# Patient Record
Sex: FEMALE | URBAN - METROPOLITAN AREA
[De-identification: names, ages, dates, MRNs, and addresses within clinical notes are randomized per-mention and may not be internally consistent; named-entity substitution may affect disease eponyms.]

---

## 2024-09-27 LAB
C. TRACHOMATIS, EXTERNAL RESULT: NEGATIVE
HEP B, EXTERNAL RESULT: NEGATIVE
HIV, EXTERNAL RESULT: NEGATIVE
N. GONORRHOEAE, EXTERNAL RESULT: NEGATIVE
RPR, EXTERNAL RESULT: NONREACTIVE
RUBELLA TITER, EXTERNAL RESULT: NORMAL

## 2024-11-06 ENCOUNTER — TELEPHONE (OUTPATIENT)
Age: 34
End: 2024-11-06

## 2024-11-06 NOTE — TELEPHONE ENCOUNTER
Spoke w/ patient and confirmed name and      Scheduled for GDM consult on  @ 8:30 [facility] (provided address)  (patient declined  appointment)     Early anatomy for early GDM and GC on  @ 9:45 [facility] (provided address)     Advised patient to arrive 10 min early to fill out paperwork    Informed patient that she could bring two guest over the age of 12.

## 2024-11-11 ENCOUNTER — TELEPHONE (OUTPATIENT)
Age: 34
End: 2024-11-11

## 2024-11-11 NOTE — TELEPHONE ENCOUNTER
Spoke w/ patient and confirmed name and      Changed office visit to VV at 8:30.    Patient confirmed appointment change.

## 2024-11-12 ENCOUNTER — TELEMEDICINE (OUTPATIENT)
Age: 34
End: 2024-11-12

## 2024-11-12 DIAGNOSIS — Z86.32 HISTORY OF GESTATIONAL DIABETES MELLITUS (GDM): ICD-10-CM

## 2024-11-12 DIAGNOSIS — O24.419 GESTATIONAL DIABETES MELLITUS (GDM) IN SECOND TRIMESTER, GESTATIONAL DIABETES METHOD OF CONTROL UNSPECIFIED: Primary | ICD-10-CM

## 2024-11-12 DIAGNOSIS — R73.03 PREDIABETES: ICD-10-CM

## 2024-11-12 DIAGNOSIS — Z83.3 FAMILY HISTORY OF DIABETES MELLITUS (DM): ICD-10-CM

## 2024-11-12 PROCEDURE — 99213 OFFICE O/P EST LOW 20 MIN: CPT

## 2024-11-12 RX ORDER — PEN NEEDLE, DIABETIC 31 GX5/16"
NEEDLE, DISPOSABLE MISCELLANEOUS
Qty: 200 EACH | Refills: 3 | Status: SHIPPED | OUTPATIENT
Start: 2024-11-12

## 2024-11-12 RX ORDER — GLUCOSAMINE HCL/CHONDROITIN SU 500-400 MG
CAPSULE ORAL
Qty: 200 STRIP | Refills: 3 | Status: SHIPPED | OUTPATIENT
Start: 2024-11-12

## 2024-11-12 RX ORDER — LANCETS 30 GAUGE
EACH MISCELLANEOUS
Qty: 200 EACH | Refills: 3 | Status: SHIPPED | OUTPATIENT
Start: 2024-11-12

## 2024-11-12 RX ORDER — BLOOD-GLUCOSE METER
EACH MISCELLANEOUS
Qty: 1 KIT | Refills: 0 | Status: SHIPPED | OUTPATIENT
Start: 2024-11-12

## 2024-11-12 NOTE — PROGRESS NOTES
Assessment & Plan   ASSESSMENT/PLAN:  1. Gestational diabetes mellitus (GDM) in second trimester, gestational diabetes method of control unspecified  2. Prediabetes  3. History of gestational diabetes mellitus (GDM)  4. Family history of diabetes mellitus (DM)    New GDM/ Pre-diabetes/Hx GDMA1/Fam Hx T2DM (father)  -10/13/24: 1 hr gtt fail 171; 10/29/24: 3 hr gtt fail 101 201 165 124  -24 A1C 5.7  -New GDM education reviewed (see below) and supplies sent to pharmacy  -Pt declines diabetes ed ref at this time. Reports she is well versed r/t GDMA1 in prior pregnancy  -Pt counseled uncontrolled diabetes mellitus can result in fetal macrosomia as well as other obstetrical complications such as preeclampsia, placental problems such as placental insufficiency, and other adverse pregnancy outcomes such as stillbirth. She is at approximately 50-60% risk of developing diabetes in the future.  -Obtain baseline CMP, TSH, --> CBC 24 wnl   -Submit logs weekly  -Pt aware is scheduled for initial MFM U/S appt 24    Recommendations  -Submit logs weekly   -Obtain baseline CMP, TSH  -F/U on  for initial MFM U/S    Subjective   Janie Griffith (:  1990) is a 34 y.o. female,New patient, here for evaluation of the following chief complaint(s):  1. Gestational diabetes mellitus (GDM) in second trimester, gestational diabetes method of control unspecified  2. Prediabetes  3. History of gestational diabetes mellitus (GDM)  4. Family history of diabetes mellitus (DM)    Patient has been newly diagnosed with Gestational Diabetes (GDM). During office visit today, verbal and written teaching were provided to the patient on the following: Gestational Diabetes (GDM), nutrition and carbohydrate choices, how to self-administer finger sticks, blood glucose goals, logging blood sugars, and signs and symptoms of hypoglycemia and hyperglycemia. Patient instructed to check blood sugars 4 times a day: fasting and 1-2 hours

## 2024-11-19 ENCOUNTER — TELEMEDICINE (OUTPATIENT)
Age: 34
End: 2024-11-19
Payer: COMMERCIAL

## 2024-11-19 ENCOUNTER — ROUTINE PRENATAL (OUTPATIENT)
Age: 34
End: 2024-11-19
Payer: COMMERCIAL

## 2024-11-19 VITALS — HEIGHT: 63 IN | HEART RATE: 97 BPM | SYSTOLIC BLOOD PRESSURE: 115 MMHG | DIASTOLIC BLOOD PRESSURE: 76 MMHG

## 2024-11-19 DIAGNOSIS — Z3A.15 15 WEEKS GESTATION OF PREGNANCY: ICD-10-CM

## 2024-11-19 DIAGNOSIS — O09.522 SUPERVISION OF ELDERLY MULTIGRAVIDA IN SECOND TRIMESTER: Primary | ICD-10-CM

## 2024-11-19 DIAGNOSIS — Z3A.15 15 WEEKS GESTATION OF PREGNANCY: Primary | ICD-10-CM

## 2024-11-19 PROCEDURE — 76805 OB US >/= 14 WKS SNGL FETUS: CPT | Performed by: STUDENT IN AN ORGANIZED HEALTH CARE EDUCATION/TRAINING PROGRAM

## 2024-11-19 PROCEDURE — 99204 OFFICE O/P NEW MOD 45 MIN: CPT | Performed by: STUDENT IN AN ORGANIZED HEALTH CARE EDUCATION/TRAINING PROGRAM

## 2024-11-19 PROCEDURE — 96040 PR MEDICAL GENETICS COUNSELING EACH 30 MINUTES: CPT | Performed by: COUNSELOR

## 2024-11-19 NOTE — PROGRESS NOTES
Patient was seen 11/19/2024      Please look under media to view full consult and ultrasound report in ViewPoint.         Emi Chew MD   Maternal Fetal Medicine

## 2024-11-19 NOTE — PROGRESS NOTES
Chief Complaint   Patient presents with    New Patient        /76   Pulse 97   Ht 1.6 m (5' 3\")   LMP 07/26/2024      1. Have you been to the ER, urgent care clinic since your last visit?  Hospitalized since your last visit?No    2. Have you seen or consulted any other health care providers outside of the [facility] since your last visit?  Include any pap smears or colon screening. Yes Where: Dr Carlos CABALIZ

## 2024-11-19 NOTE — PROGRESS NOTES
Janie Griffith is a 34 y.o. female seen on 24 in our [place]'s office for genetic counseling regarding advanced maternal age. Janie Griffith will be 34 at the Estimated Date of Delivery: 25; . Genetic counseling was performed via Telemedicine today. The patient was accompanied to her appointment by her partner and father of the baby (FOB), Leodan.    Impression and Recommendations:    - Advanced maternal age was reviewed with the patient, including relevant genetic risks, screening and testing options, and the patient's normal NIPT results.  - The patient DECLINED diagnostic amniocentesis at this time.  - The patient DECLINED all carrier screening options at this time.  - An ultrasound and MFM consult were performed today by Dr. Emi Chew MD. Please see her note for further details.    Family and pregnancy histories were taken. The following information was discussed with the patient:    Genetic Screening:    The association between advancing maternal age and increasing risk of chromosomal aneuploidy was reviewed. Based on the patient's age of 34 at delivery, her initial risk to have a baby with Down syndrome was 1 in 281 and her initial risk to have a baby with any chromosomal aneuploidy was 1 in 149. Fortunately, the patient has had normal non-invasive prenatal testing (NIPT), indicating a <1 in 10,000 risk for Down syndrome, trisomy 18, trisomy 13 and sex chromosomal aneuploidies in the current pregnancy. Results were consistent with a female fetus.    Nevertheless, the benefits, risks and limitations of non-invasive prenatal testing (NIPT), ultrasonography and amniocentesis in the diagnosis of fetal aneuploidy were reviewed.     Amniocentesis is typically performed between 16 and 20 weeks gestation, although it can often be performed earlier or later with reasonable safety, depending on the circumstances of the case. The risk for complication from amniocentesis is 1 in 400.  The

## 2024-11-19 NOTE — PROCEDURES
PATIENT: SHANNON HUI   -  : 1990   -  DOS:2024   -  INTERPRETING PROVIDER:Emi Chew,   Indication  ========    AMA, GDM, Early Anatomy    Method  ======    Transabdominal ultrasound examination. View: Suboptimal view: limited by early gestational age and fetal position    Dating  ======    LMP on: 2024  GA by LMP 16 w + 4 d  MAI by LMP: 2025  Previous Ultrasound on: 2024  Type of prior assessment: GA  GA at prior assessment date 8 w + 1 d  GA by previous U/S 15 w + 5 d  MAI by previous Ultrasound: 2025  Ultrasound examination on: 2024  GA by U/S based upon: AC, BPD, Femur, HC  GA by U/S 16 w + 1 d  MAI by U/S: 2025  Assigned: based on ultrasound (GA), selected on 2024  Assigned GA 15 w + 5 d  Assigned MAI: 2025    Fetal Growth Overview  =================    Exam date        GA              BPD (mm)          HC (mm)              AC (mm)            FL (mm)             HL (mm)            EFW (g)  2024        15w 5d        32.9     71%        120.1    49%        106     79%        19.6    50%        17.2     21%        147    69%    Fetal Biometry  ============    Standard  BPD 32.9 mm 16w 2d 71% Hadlock  OFD 41.9 mm 16w 3d 76% Kalee  .1 mm 16w 0d 49% Hadlock  Cerebellum tr 14.8 mm  Nuchal fold 2.2 mm  .0 mm 16w 4d 79% Hadlock  Femur 19.6 mm 15w 6d 50% Hadlock  Humerus 17.2 mm 14w 6d 21% Kalee   g 16w 0d 69% Hadlock  EFW (lb) 0 lb  EFW (oz) 5 oz  EFW by: Hadlock (BPD-HC-AC-FL)  Extended  CM 3.8 mm  50% Nicolaides  Nasal bone 3.8 mm  Other Structures   bpm    General Evaluation  ==============    Cardiac activity present.  bpm. Fetal movements: visualized. Presentation: BREECH  Placenta: Placental site: anterior, appropriate distance from the internal os  Umbilical cord: Cord vessels: 3 vessel cord. Insertion site: central  Amniotic fluid: Amount of AF: normal. MVP 3.7 cm    Fetal

## 2024-12-12 RX ORDER — BLOOD-GLUCOSE METER
KIT MISCELLANEOUS
Qty: 1 KIT | Refills: 0 | Status: SHIPPED | OUTPATIENT
Start: 2024-12-12

## 2025-01-03 ENCOUNTER — ROUTINE PRENATAL (OUTPATIENT)
Age: 35
End: 2025-01-03
Payer: COMMERCIAL

## 2025-01-03 ENCOUNTER — ROUTINE PRENATAL (OUTPATIENT)
Age: 35
End: 2025-01-03

## 2025-01-03 VITALS — DIASTOLIC BLOOD PRESSURE: 68 MMHG | HEART RATE: 98 BPM | SYSTOLIC BLOOD PRESSURE: 124 MMHG

## 2025-01-03 DIAGNOSIS — Z3A.22 22 WEEKS GESTATION OF PREGNANCY: ICD-10-CM

## 2025-01-03 DIAGNOSIS — O09.522 ELDERLY MULTIGRAVIDA IN SECOND TRIMESTER: ICD-10-CM

## 2025-01-03 DIAGNOSIS — O24.410 GDM, CLASS A1: Primary | ICD-10-CM

## 2025-01-03 DIAGNOSIS — O24.419 GESTATIONAL DIABETES MELLITUS (GDM) IN SECOND TRIMESTER, GESTATIONAL DIABETES METHOD OF CONTROL UNSPECIFIED: Primary | ICD-10-CM

## 2025-01-03 PROCEDURE — 99024 POSTOP FOLLOW-UP VISIT: CPT | Performed by: OBSTETRICS & GYNECOLOGY

## 2025-01-03 PROCEDURE — 76811 OB US DETAILED SNGL FETUS: CPT | Performed by: OBSTETRICS & GYNECOLOGY

## 2025-01-03 NOTE — PROCEDURES
PATIENT: SHANNON HUI   -  : 1990   -  DOS:2025   -  INTERPRETING PROVIDER:Everton Manrique,   Indication  ========    Advanced Maternal Age, Gestational diabetes    Method  ======    Transabdominal ultrasound examination. View: Good view    Dating  ======    LMP on: 2024  GA by LMP 23 w + 0 d  MAI by LMP: 2025  Previous Ultrasound on: 2024  Type of prior assessment: GA  GA at prior assessment date 8 w + 1 d  GA by previous U/S 22 w + 1 d  MAI by previous Ultrasound: 2025  Ultrasound examination on: 1/3/2025  GA by U/S based upon: AC, BPD, Femur, HC  GA by U/S 22 w + 0 d  MAI by U/S: 2025  Assigned: based on ultrasound (GA), selected on 2024  Assigned GA 22 w + 1 d  Assigned MAI: 2025    Fetal Growth Overview  =================    Exam date        GA              BPD (mm)          HC (mm)              AC (mm)              FL (mm)             HL (mm)             EFW (g)  2024        15w 5d        32.9     71%        120.1    49%        106     79%           19.6    50%        17.2    21%         147    69%  01/3/2025          22w 1d        50    13%           194.2    19%         176.4    55%        39    53%           36.9     73%        500    55%    Fetal Biometry  ============    Standard  BPD 50.0 mm 21w 1d 13% Hadlock  OFD 70.5 mm 23w 4d 88% Kalee  .2 mm 21w 5d 19% Hadlock  Cerebellum tr 24.3 mm 22w 2d 78% Hill  Nuchal fold 4.4 mm  .4 mm 22w 4d 55% Hadlock  Femur 39.0 mm 22w 4d 53% Hadlock  Humerus 36.9 mm 22w 6d 73% Kalee   g 22w 2d 55% Hadlock  EFW (lb) 1 lb  EFW (oz) 2 oz  EFW by: Hadlock (BPD-HC-AC-FL)  Extended   4.8 mm  CM 3.8 mm  7% Nicolaides  Nasal bone 6.6 mm  Head / Face / Neck  Nasal bone: present  Other Structures   bpm    General Evaluation  ==============    Cardiac activity present.  bpm. Fetal movements: visualized. Presentation: BREECH  Placenta: Placental site: anterior, appropriate distance from the

## 2025-01-03 NOTE — PROGRESS NOTES
Assessment & Plan   ASSESSMENT/PLAN:  1. GDM, class A1    Janie is a 33 y/o  who is seen today for a Gdm log review.    GDM A1  -Log reviewed:  Overall well controlled with diet. 1/9 elevated fasting, 1/9 elevated breakfast, 1/9 elevated dinner.  -Continue dietary control  -Submit logs weekly  -Next [facility] appt scheduled       Subjective   Janie Griffith (:  1990) is a 34 y.o. female,Established patient, here for evaluation of the following chief complaint(s):  1. GDM, class A1    Objective   Physical Exam  Vitals reviewed.   Constitutional:       Appearance: Normal appearance.   Neurological:      Mental Status: She is alert.   Psychiatric:         Mood and Affect: Mood normal.         Judgment: Judgment normal.       On this date 1/3/2025 I have spent 15 minutes reviewing previous notes, test results and face to face with the patient discussing the diagnosis and importance of compliance with the treatment plan as well as documenting on the day of the visit.      An electronic signature was used to authenticate this note.    --CHARLIE Almaraz - CNP

## 2025-01-31 ENCOUNTER — ROUTINE PRENATAL (OUTPATIENT)
Age: 35
End: 2025-01-31
Payer: COMMERCIAL

## 2025-01-31 VITALS — DIASTOLIC BLOOD PRESSURE: 73 MMHG | SYSTOLIC BLOOD PRESSURE: 107 MMHG

## 2025-01-31 DIAGNOSIS — O24.410 GDM, CLASS A1: Primary | ICD-10-CM

## 2025-01-31 DIAGNOSIS — Z3A.26 26 WEEKS GESTATION OF PREGNANCY: ICD-10-CM

## 2025-01-31 PROCEDURE — 99024 POSTOP FOLLOW-UP VISIT: CPT | Performed by: OBSTETRICS & GYNECOLOGY

## 2025-01-31 PROCEDURE — 76816 OB US FOLLOW-UP PER FETUS: CPT | Performed by: OBSTETRICS & GYNECOLOGY

## 2025-01-31 RX ORDER — MAGNESIUM 30 MG
30 TABLET ORAL 2 TIMES DAILY
COMMUNITY

## 2025-01-31 NOTE — PROCEDURES
PATIENT: SHANNON HUI   -  : 1990   -  DOS:2025   -  INTERPRETING PROVIDER:Everton Manrique,   Indication  ========    Advanced Maternal Age, Gestational diabetes    Method  ======    Transabdominal ultrasound examination. View: Sufficient    Pregnancy  =========    Andres pregnancy. Number of fetuses: 1    Dating  ======    LMP on: 2024  GA by LMP 27 w + 0 d  MAI by LMP: 2025  Previous Ultrasound on: 2024  Type of prior assessment: GA  GA at prior assessment date 8 w + 1 d  GA by previous U/S 26 w + 1 d  MAI by previous Ultrasound: 2025  Ultrasound examination on: 2025  GA by U/S based upon: AC, BPD, Femur, HC  GA by U/S 26 w + 2 d  MAI by U/S: 2025  Assigned: based on ultrasound (GA), selected on 2024  Assigned GA 26 w + 1 d  Assigned MAI: 2025    Fetal Biometry  ============    Standard  BPD 64.9 mm 26w 2d 42% Hadlock  OFD 85.6 mm 27w 5d 89% Kalee  .6 mm 26w 2d 28% Hadlock  .8 mm 26w 2d 44% Hadlock  Femur 48.2 mm 26w 1d 36% Hadlock   g 26w 0d 41% Hadlock  EFW (lb) 2 lb  EFW (oz) 0 oz  EFW by: Hadlock (BPD-HC-AC-FL)  Other Structures   bpm    General Evaluation  ==============    Cardiac activity present.  bpm. Fetal movements: visualized. Presentation: Transverse, head to maternal left  Placenta: Placental site: anterior, appropriate distance from the internal os  Umbilical cord: Cord vessels: 3 vessel cord. Insertion site: central  Amniotic fluid: Amount of AF: normal. MVP 5.5 cm    Fetal Anatomy  ===========    Heart / Thorax  Interventricular septum: normal  Stomach: normal  Kidneys: normal  Bladder: normal  Wants to know fetal sex: yes    Findings  =======    Previously, the fetal anatomy survey was incomplete. Today, the remaining structures were visualized and appear to be normal within the limitations of ultrasound  technology. Intrauterine Andres pregnancy at 26w 1d by clinical dates.  EFW is 907 g at 41%,

## 2025-02-27 DIAGNOSIS — O24.410 GDM, CLASS A1: Primary | ICD-10-CM

## 2025-02-27 DIAGNOSIS — O24.419 GESTATIONAL DIABETES MELLITUS (GDM), ANTEPARTUM, GESTATIONAL DIABETES METHOD OF CONTROL UNSPECIFIED: Primary | ICD-10-CM

## 2025-02-28 ENCOUNTER — ROUTINE PRENATAL (OUTPATIENT)
Age: 35
End: 2025-02-28
Payer: COMMERCIAL

## 2025-02-28 VITALS — SYSTOLIC BLOOD PRESSURE: 112 MMHG | HEART RATE: 99 BPM | DIASTOLIC BLOOD PRESSURE: 77 MMHG

## 2025-02-28 DIAGNOSIS — O24.410 DIET CONTROLLED GESTATIONAL DIABETES MELLITUS (GDM) IN THIRD TRIMESTER: Primary | ICD-10-CM

## 2025-02-28 DIAGNOSIS — O09.523 ELDERLY MULTIGRAVIDA IN THIRD TRIMESTER: ICD-10-CM

## 2025-02-28 DIAGNOSIS — Z3A.30 30 WEEKS GESTATION OF PREGNANCY: ICD-10-CM

## 2025-02-28 PROCEDURE — 76816 OB US FOLLOW-UP PER FETUS: CPT | Performed by: OBSTETRICS & GYNECOLOGY

## 2025-02-28 PROCEDURE — 99024 POSTOP FOLLOW-UP VISIT: CPT | Performed by: OBSTETRICS & GYNECOLOGY

## 2025-02-28 NOTE — PROGRESS NOTES
Janie Griffith is a 34 y.o. female    Chief Complaint   Patient presents with    Pregnancy Ultrasound       /77   Pulse 99   LMP 07/26/2024         1. Have you been to the ER, urgent care clinic since your last visit?  Hospitalized since your last visit? No    2. Have you seen or consulted any other health care providers outside of the [facility] since your last visit?  Include any pap smears or colon screening. No    Learning Assessment:       No data to display                Fall Risk Assessment:       No data to display                Abuse Screening:       No data to display                ADL Screening:       No data to display

## 2025-02-28 NOTE — PROCEDURES
PATIENT: SHANNON HUI   -  : 1990   -  DOS:2025   -  INTERPRETING PROVIDER:Everton Manrique,   Indication  ========    Advanced Maternal Age, Gestational diabetes    Method  ======    Transabdominal ultrasound examination. View: Sufficient    Pregnancy  =========    Andres pregnancy. Number of fetuses: 1    Dating  ======    LMP on: 2024  GA by LMP 31 w + 0 d  MAI by LMP: 2025  Previous Ultrasound on: 2024  Type of prior assessment: GA  GA at prior assessment date 8 w + 1 d  GA by previous U/S 30 w + 1 d  MAI by previous Ultrasound: 2025  Ultrasound examination on: 2025  GA by U/S based upon: AC, BPD, Femur, HC  GA by U/S 31 w + 2 d  MAI by U/S: 2025  Assigned: based on ultrasound (GA), selected on 2024  Assigned GA 30 w + 1 d  Assigned MAI: 2025    Fetal Biometry  ============    Standard  BPD 77.6 mm 31w 1d 69% Hadlock  .9 mm 33w 0d 98% Kalee  .3 mm 31w 4d 57% Hadlock  .2 mm 31w 5d 88% Hadlock  Femur 59.0 mm 30w 5d 54% Hadlock  EFW 1,759 g 31w 0d 79% Hadlock  EFW (lb) 3 lb  EFW (oz) 14 oz  EFW by: Hadlock (BPD-HC-AC-FL)  Other Structures   bpm    General Evaluation  ==============    Cardiac activity present.  bpm. Fetal movements: visualized. Presentation: BREECH  Placenta: Placental site: anterior, appropriate distance from the internal os  Umbilical cord: Cord vessels: 3 vessel cord. Insertion site: central  Amniotic fluid: Amount of AF: normal. MVP 6.6 cm. TWILA 21.4 cm. Q1 6.0 cm, Q2 4.7 cm, Q3 4.1 cm, Q4 6.6 cm    Fetal Anatomy  ===========    Heart / Thorax  Interventricular septum: normal  Stomach: normal  Kidneys: normal  Bladder: normal  Wants to know fetal sex: yes    Findings  =======    Intrauterine Andres pregnancy at 30w 1d by clinical dates.  EFW is 1759 g at 79%, abdominal circumference at 88%.  Amniotic fluid: normal.  Placenta is anterior, appropriate distance from the internal os.  BREECH

## 2025-03-28 ENCOUNTER — ROUTINE PRENATAL (OUTPATIENT)
Age: 35
End: 2025-03-28
Payer: COMMERCIAL

## 2025-03-28 VITALS — SYSTOLIC BLOOD PRESSURE: 115 MMHG | HEART RATE: 100 BPM | DIASTOLIC BLOOD PRESSURE: 77 MMHG

## 2025-03-28 DIAGNOSIS — Z3A.34 34 WEEKS GESTATION OF PREGNANCY: ICD-10-CM

## 2025-03-28 DIAGNOSIS — O24.410 DIET CONTROLLED GESTATIONAL DIABETES MELLITUS (GDM) IN THIRD TRIMESTER: ICD-10-CM

## 2025-03-28 DIAGNOSIS — O09.523 ELDERLY MULTIGRAVIDA IN THIRD TRIMESTER: Primary | ICD-10-CM

## 2025-03-28 PROCEDURE — 99024 POSTOP FOLLOW-UP VISIT: CPT | Performed by: OBSTETRICS & GYNECOLOGY

## 2025-03-28 PROCEDURE — 76816 OB US FOLLOW-UP PER FETUS: CPT | Performed by: OBSTETRICS & GYNECOLOGY

## 2025-03-28 NOTE — PROCEDURES
PATIENT: SHANNON HUI   -  : 1990   -  DOS:2025   -  INTERPRETING PROVIDER:Everton Manrique,   Indication  ========    Advanced Maternal Age, Gestational diabetes    Method  ======    Transabdominal ultrasound examination. View: Sufficient    Pregnancy  =========    Andres pregnancy. Number of fetuses: 1    Dating  ======    LMP on: 2024  GA by LMP 35 w + 0 d  MAI by LMP: 2025  Previous Ultrasound on: 2024  Type of prior assessment: GA  GA at prior assessment date 8 w + 1 d  GA by previous U/S 34 w + 1 d  MAI by previous Ultrasound: 2025  Ultrasound examination on: 3/28/2025  GA by U/S based upon: AC, BPD, Femur, HC  GA by U/S 35 w + 3 d  MAI by U/S: 2025  Assigned: based on ultrasound (GA), selected on 2024  Assigned GA 34 w + 1 d  Assigned MAI: 2025    Fetal Biometry  ============    Standard  BPD 86.9 mm 35w 1d 75% Hadlock  .7 mm 38w 6d 99% Kalee  .5 mm 36w 4d 76% Hadlock  .8 mm 35w 2d 84% Hadlock  Femur 67.3 mm 34w 4d 53% Hadlock  EFW 2,626 g 35w 1d 75% Hadlock  EFW (lb) 5 lb  EFW (oz) 13 oz  EFW by: Hadlock (BPD-HC-AC-FL)  Other Structures   bpm    General Evaluation  ==============    Cardiac activity present.  bpm. Fetal movements: visualized. Presentation: Cephalic  Placenta: Placental site: anterior, appropriate distance from the internal os  Umbilical cord: Cord vessels: 3 vessel cord. Insertion site: central  Amniotic fluid: Amount of AF: normal. MVP 7.5 cm. TWILA 20.1 cm. Q1 7.5 cm, Q2 5.6 cm, Q3 5.1 cm, Q4 2.0 cm    Fetal Anatomy  ===========    Stomach: normal  Kidneys: normal  Bladder: normal  Wants to know fetal sex: yes    Biophysical Profile  ==============    Incidental BPP     Findings  =======    Intrauterine Andres pregnancy at 34w 1d by clinical dates.  EFW is 2626 g at 75%, abdominal circumference at 84%.  Amniotic fluid: normal.  Placenta is anterior, appropriate distance from the internal

## 2025-04-09 RX ORDER — BLOOD SUGAR DIAGNOSTIC
STRIP MISCELLANEOUS
Qty: 200 STRIP | Refills: 3 | Status: SHIPPED | OUTPATIENT
Start: 2025-04-09

## 2025-04-16 LAB — GBS, EXTERNAL RESULT: NEGATIVE

## 2025-04-17 ENCOUNTER — NURSE ONLY (OUTPATIENT)
Age: 35
End: 2025-04-17

## 2025-04-17 NOTE — PROGRESS NOTES
Left a voicemail with 's nurse at [facility] to review if patient needs to continue to be seen by [facility]

## 2025-04-18 ENCOUNTER — NURSE ONLY (OUTPATIENT)
Age: 35
End: 2025-04-18

## 2025-04-18 NOTE — PROGRESS NOTES
Dr. Gonzalez's (pt's OB provider) office returned our messages regarding patient's GDM plan of care. OB wants patient to be followed for scans in her office weekly and to be followed for GDM by MFM. This RN explained that in order for MFM to follow blood sugars, we also have to be seeing patient in the clinic. OB office will call MFM to discuss further on Monday, 4/21/25.

## 2025-04-18 NOTE — PROGRESS NOTES
This RN left a message with Dr. Gonzalez's office (x2) to follow up with patient's GDM management. Left mychart message with patient to see if anyone is managing her GDM.

## 2025-05-10 ENCOUNTER — ANESTHESIA EVENT (OUTPATIENT)
Facility: HOSPITAL | Age: 35
End: 2025-05-10
Payer: COMMERCIAL

## 2025-05-10 ENCOUNTER — HOSPITAL ENCOUNTER (INPATIENT)
Facility: HOSPITAL | Age: 35
LOS: 2 days | Discharge: HOME | End: 2025-05-12
Attending: OBSTETRICS & GYNECOLOGY | Admitting: ADVANCED PRACTICE MIDWIFE
Payer: COMMERCIAL

## 2025-05-10 ENCOUNTER — ANESTHESIA (OUTPATIENT)
Facility: HOSPITAL | Age: 35
End: 2025-05-10
Payer: COMMERCIAL

## 2025-05-10 PROBLEM — O24.410 DIET CONTROLLED GESTATIONAL DIABETES MELLITUS (GDM) IN THIRD TRIMESTER: Status: ACTIVE | Noted: 2025-05-10

## 2025-05-10 PROBLEM — Z37.9 NORMAL LABOR: Status: ACTIVE | Noted: 2025-05-10

## 2025-05-10 PROBLEM — O09.523 ELDERLY MULTIGRAVIDA IN THIRD TRIMESTER: Status: ACTIVE | Noted: 2025-05-10

## 2025-05-10 LAB
ERYTHROCYTE [DISTWIDTH] IN BLOOD BY AUTOMATED COUNT: 13.6 % (ref 11.5–14.5)
GLUCOSE BLD STRIP.AUTO-MCNC: 99 MG/DL (ref 65–117)
HCT VFR BLD AUTO: 38.7 % (ref 35–47)
HGB BLD-MCNC: 13.3 G/DL (ref 11.5–16)
MCH RBC QN AUTO: 29.8 PG (ref 26–34)
MCHC RBC AUTO-ENTMCNC: 34.4 G/DL (ref 30–36.5)
MCV RBC AUTO: 86.6 FL (ref 80–99)
NRBC # BLD: 0 K/UL (ref 0–0.01)
NRBC BLD-RTO: 0 PER 100 WBC
PLATELET # BLD AUTO: 256 K/UL (ref 150–400)
PMV BLD AUTO: 9.6 FL (ref 8.9–12.9)
RBC # BLD AUTO: 4.47 M/UL (ref 3.8–5.2)
SERVICE CMNT-IMP: NORMAL
WBC # BLD AUTO: 10.4 K/UL (ref 3.6–11)

## 2025-05-10 PROCEDURE — 82962 GLUCOSE BLOOD TEST: CPT

## 2025-05-10 PROCEDURE — 4500000002 HC ER NO CHARGE

## 2025-05-10 PROCEDURE — 86592 SYPHILIS TEST NON-TREP QUAL: CPT

## 2025-05-10 PROCEDURE — 7210000100 HC LABOR FEE PER 1 HR

## 2025-05-10 PROCEDURE — 99283 EMERGENCY DEPT VISIT LOW MDM: CPT

## 2025-05-10 PROCEDURE — 2500000003 HC RX 250 WO HCPCS: Performed by: STUDENT IN AN ORGANIZED HEALTH CARE EDUCATION/TRAINING PROGRAM

## 2025-05-10 PROCEDURE — 3700000025 EPIDURAL BLOCK: Performed by: STUDENT IN AN ORGANIZED HEALTH CARE EDUCATION/TRAINING PROGRAM

## 2025-05-10 PROCEDURE — 86850 RBC ANTIBODY SCREEN: CPT

## 2025-05-10 PROCEDURE — 86900 BLOOD TYPING SEROLOGIC ABO: CPT

## 2025-05-10 PROCEDURE — 85027 COMPLETE CBC AUTOMATED: CPT

## 2025-05-10 PROCEDURE — 7220000101 HC DELIVERY VAGINAL/SINGLE

## 2025-05-10 PROCEDURE — 0KQM0ZZ REPAIR PERINEUM MUSCLE, OPEN APPROACH: ICD-10-PCS | Performed by: ADVANCED PRACTICE MIDWIFE

## 2025-05-10 PROCEDURE — 7100000001 HC PACU RECOVERY - ADDTL 15 MIN

## 2025-05-10 PROCEDURE — 86901 BLOOD TYPING SEROLOGIC RH(D): CPT

## 2025-05-10 PROCEDURE — 6360000002 HC RX W HCPCS: Performed by: STUDENT IN AN ORGANIZED HEALTH CARE EDUCATION/TRAINING PROGRAM

## 2025-05-10 PROCEDURE — 6360000002 HC RX W HCPCS: Performed by: ADVANCED PRACTICE MIDWIFE

## 2025-05-10 PROCEDURE — 00HU33Z INSERTION OF INFUSION DEVICE INTO SPINAL CANAL, PERCUTANEOUS APPROACH: ICD-10-PCS | Performed by: STUDENT IN AN ORGANIZED HEALTH CARE EDUCATION/TRAINING PROGRAM

## 2025-05-10 PROCEDURE — 6370000000 HC RX 637 (ALT 250 FOR IP): Performed by: ADVANCED PRACTICE MIDWIFE

## 2025-05-10 PROCEDURE — 1120000000 HC RM PRIVATE OB

## 2025-05-10 PROCEDURE — 51701 INSERT BLADDER CATHETER: CPT

## 2025-05-10 PROCEDURE — 7100000000 HC PACU RECOVERY - FIRST 15 MIN

## 2025-05-10 PROCEDURE — 4A1HXCZ MONITORING OF PRODUCTS OF CONCEPTION, CARDIAC RATE, EXTERNAL APPROACH: ICD-10-PCS | Performed by: ADVANCED PRACTICE MIDWIFE

## 2025-05-10 PROCEDURE — 2580000003 HC RX 258: Performed by: ADVANCED PRACTICE MIDWIFE

## 2025-05-10 RX ORDER — IBUPROFEN 400 MG/1
800 TABLET, FILM COATED ORAL EVERY 8 HOURS SCHEDULED
Status: DISCONTINUED | OUTPATIENT
Start: 2025-05-10 | End: 2025-05-12 | Stop reason: HOSPADM

## 2025-05-10 RX ORDER — SODIUM CHLORIDE, SODIUM LACTATE, POTASSIUM CHLORIDE, AND CALCIUM CHLORIDE .6; .31; .03; .02 G/100ML; G/100ML; G/100ML; G/100ML
500 INJECTION, SOLUTION INTRAVENOUS PRN
Status: DISCONTINUED | OUTPATIENT
Start: 2025-05-10 | End: 2025-05-12 | Stop reason: HOSPADM

## 2025-05-10 RX ORDER — SODIUM CHLORIDE 0.9 % (FLUSH) 0.9 %
5-40 SYRINGE (ML) INJECTION PRN
Status: DISCONTINUED | OUTPATIENT
Start: 2025-05-10 | End: 2025-05-10

## 2025-05-10 RX ORDER — LIDOCAINE HYDROCHLORIDE AND EPINEPHRINE 20; 5 MG/ML; UG/ML
INJECTION, SOLUTION EPIDURAL; INFILTRATION; INTRACAUDAL; PERINEURAL
Status: COMPLETED
Start: 2025-05-10 | End: 2025-05-10

## 2025-05-10 RX ORDER — ACETAMINOPHEN 325 MG/1
650 TABLET ORAL EVERY 4 HOURS PRN
Status: DISCONTINUED | OUTPATIENT
Start: 2025-05-10 | End: 2025-05-12 | Stop reason: SDUPTHER

## 2025-05-10 RX ORDER — CARBOPROST TROMETHAMINE 250 UG/ML
250 INJECTION, SOLUTION INTRAMUSCULAR PRN
Status: DISCONTINUED | OUTPATIENT
Start: 2025-05-10 | End: 2025-05-12 | Stop reason: HOSPADM

## 2025-05-10 RX ORDER — MISOPROSTOL 200 UG/1
400 TABLET ORAL PRN
Status: DISCONTINUED | OUTPATIENT
Start: 2025-05-10 | End: 2025-05-12 | Stop reason: HOSPADM

## 2025-05-10 RX ORDER — BUPIVACAINE HYDROCHLORIDE 2.5 MG/ML
INJECTION, SOLUTION EPIDURAL; INFILTRATION; INTRACAUDAL; PERINEURAL
Status: COMPLETED
Start: 2025-05-10 | End: 2025-05-10

## 2025-05-10 RX ORDER — LIDOCAINE HYDROCHLORIDE AND EPINEPHRINE 20; 5 MG/ML; UG/ML
INJECTION, SOLUTION EPIDURAL; INFILTRATION; INTRACAUDAL; PERINEURAL
Status: DISCONTINUED | OUTPATIENT
Start: 2025-05-10 | End: 2025-05-10 | Stop reason: SDUPTHER

## 2025-05-10 RX ORDER — SODIUM CHLORIDE 0.9 % (FLUSH) 0.9 %
5-40 SYRINGE (ML) INJECTION EVERY 12 HOURS SCHEDULED
Status: DISCONTINUED | OUTPATIENT
Start: 2025-05-10 | End: 2025-05-10

## 2025-05-10 RX ORDER — ACETAMINOPHEN 500 MG
1000 TABLET ORAL EVERY 8 HOURS SCHEDULED
Status: DISCONTINUED | OUTPATIENT
Start: 2025-05-10 | End: 2025-05-12 | Stop reason: HOSPADM

## 2025-05-10 RX ORDER — ONDANSETRON 2 MG/ML
4 INJECTION INTRAMUSCULAR; INTRAVENOUS EVERY 6 HOURS PRN
Status: DISCONTINUED | OUTPATIENT
Start: 2025-05-10 | End: 2025-05-12 | Stop reason: SDUPTHER

## 2025-05-10 RX ORDER — DIPHENHYDRAMINE HCL 25 MG
25 CAPSULE ORAL EVERY 4 HOURS PRN
Status: DISCONTINUED | OUTPATIENT
Start: 2025-05-10 | End: 2025-05-12 | Stop reason: HOSPADM

## 2025-05-10 RX ORDER — TERBUTALINE SULFATE 1 MG/ML
0.25 INJECTION SUBCUTANEOUS
Status: DISCONTINUED | OUTPATIENT
Start: 2025-05-10 | End: 2025-05-12 | Stop reason: HOSPADM

## 2025-05-10 RX ORDER — MODIFIED LANOLIN
OINTMENT (GRAM) TOPICAL PRN
Status: DISCONTINUED | OUTPATIENT
Start: 2025-05-10 | End: 2025-05-12 | Stop reason: HOSPADM

## 2025-05-10 RX ORDER — ONDANSETRON 4 MG/1
4 TABLET, ORALLY DISINTEGRATING ORAL EVERY 6 HOURS PRN
Status: DISCONTINUED | OUTPATIENT
Start: 2025-05-10 | End: 2025-05-12 | Stop reason: HOSPADM

## 2025-05-10 RX ORDER — NALOXONE HYDROCHLORIDE 0.4 MG/ML
INJECTION, SOLUTION INTRAMUSCULAR; INTRAVENOUS; SUBCUTANEOUS PRN
Status: DISCONTINUED | OUTPATIENT
Start: 2025-05-10 | End: 2025-05-10

## 2025-05-10 RX ORDER — FENTANYL CITRATE 50 UG/ML
INJECTION, SOLUTION INTRAMUSCULAR; INTRAVENOUS
Status: DISCONTINUED | OUTPATIENT
Start: 2025-05-10 | End: 2025-05-10 | Stop reason: SDUPTHER

## 2025-05-10 RX ORDER — SODIUM CHLORIDE, SODIUM LACTATE, POTASSIUM CHLORIDE, CALCIUM CHLORIDE 600; 310; 30; 20 MG/100ML; MG/100ML; MG/100ML; MG/100ML
INJECTION, SOLUTION INTRAVENOUS CONTINUOUS
Status: DISCONTINUED | OUTPATIENT
Start: 2025-05-10 | End: 2025-05-12 | Stop reason: HOSPADM

## 2025-05-10 RX ORDER — FENTANYL/BUPIVACAINE/NS/PF 2-1250MCG
1-15 PLASTIC BAG, INJECTION (ML) INJECTION CONTINUOUS
Refills: 0 | Status: DISCONTINUED | OUTPATIENT
Start: 2025-05-10 | End: 2025-05-10

## 2025-05-10 RX ORDER — NALBUPHINE HYDROCHLORIDE 10 MG/ML
10 INJECTION INTRAMUSCULAR; INTRAVENOUS; SUBCUTANEOUS
Status: DISCONTINUED | OUTPATIENT
Start: 2025-05-10 | End: 2025-05-12 | Stop reason: HOSPADM

## 2025-05-10 RX ORDER — DOCUSATE SODIUM 100 MG/1
100 CAPSULE, LIQUID FILLED ORAL 2 TIMES DAILY
Status: DISCONTINUED | OUTPATIENT
Start: 2025-05-10 | End: 2025-05-12 | Stop reason: HOSPADM

## 2025-05-10 RX ORDER — ONDANSETRON 4 MG/1
4 TABLET, ORALLY DISINTEGRATING ORAL EVERY 6 HOURS PRN
Status: DISCONTINUED | OUTPATIENT
Start: 2025-05-10 | End: 2025-05-12 | Stop reason: SDUPTHER

## 2025-05-10 RX ORDER — BUPIVACAINE HYDROCHLORIDE 2.5 MG/ML
INJECTION, SOLUTION EPIDURAL; INFILTRATION; INTRACAUDAL; PERINEURAL
Status: DISCONTINUED | OUTPATIENT
Start: 2025-05-10 | End: 2025-05-10 | Stop reason: SDUPTHER

## 2025-05-10 RX ORDER — SODIUM CHLORIDE 9 MG/ML
INJECTION, SOLUTION INTRAVENOUS PRN
Status: DISCONTINUED | OUTPATIENT
Start: 2025-05-10 | End: 2025-05-10

## 2025-05-10 RX ORDER — DIPHENHYDRAMINE HYDROCHLORIDE 50 MG/ML
25 INJECTION, SOLUTION INTRAMUSCULAR; INTRAVENOUS EVERY 4 HOURS PRN
Status: DISCONTINUED | OUTPATIENT
Start: 2025-05-10 | End: 2025-05-12 | Stop reason: HOSPADM

## 2025-05-10 RX ORDER — EPHEDRINE SULFATE 50 MG/ML
10 INJECTION INTRAVENOUS
Status: COMPLETED | OUTPATIENT
Start: 2025-05-10 | End: 2025-05-10

## 2025-05-10 RX ORDER — ONDANSETRON 2 MG/ML
4 INJECTION INTRAMUSCULAR; INTRAVENOUS EVERY 6 HOURS PRN
Status: DISCONTINUED | OUTPATIENT
Start: 2025-05-10 | End: 2025-05-12 | Stop reason: HOSPADM

## 2025-05-10 RX ORDER — ONDANSETRON 2 MG/ML
4 INJECTION INTRAMUSCULAR; INTRAVENOUS EVERY 6 HOURS PRN
Status: DISCONTINUED | OUTPATIENT
Start: 2025-05-10 | End: 2025-05-10 | Stop reason: SDUPTHER

## 2025-05-10 RX ORDER — SODIUM CHLORIDE 9 MG/ML
25 INJECTION, SOLUTION INTRAVENOUS PRN
Status: DISCONTINUED | OUTPATIENT
Start: 2025-05-10 | End: 2025-05-10

## 2025-05-10 RX ORDER — METHYLERGONOVINE MALEATE 0.2 MG/ML
200 INJECTION INTRAVENOUS PRN
Status: DISCONTINUED | OUTPATIENT
Start: 2025-05-10 | End: 2025-05-12 | Stop reason: HOSPADM

## 2025-05-10 RX ORDER — FENTANYL CITRATE 50 UG/ML
INJECTION, SOLUTION INTRAMUSCULAR; INTRAVENOUS
Status: COMPLETED
Start: 2025-05-10 | End: 2025-05-10

## 2025-05-10 RX ADMIN — IBUPROFEN 800 MG: 400 TABLET, FILM COATED ORAL at 23:15

## 2025-05-10 RX ADMIN — BUPIVACAINE HYDROCHLORIDE 5 ML: 2.5 INJECTION, SOLUTION EPIDURAL; INFILTRATION; INTRACAUDAL; PERINEURAL at 12:55

## 2025-05-10 RX ADMIN — OXYTOCIN 87.3 MILLI-UNITS/MIN: 10 INJECTION INTRAVENOUS at 21:18

## 2025-05-10 RX ADMIN — Medication 10 ML/HR: at 13:05

## 2025-05-10 RX ADMIN — FENTANYL CITRATE 100 MCG: 50 INJECTION INTRAMUSCULAR; INTRAVENOUS at 12:55

## 2025-05-10 RX ADMIN — Medication 166.7 ML: at 21:06

## 2025-05-10 RX ADMIN — OXYTOCIN 2 MILLI-UNITS/MIN: 10 INJECTION INTRAVENOUS at 16:35

## 2025-05-10 RX ADMIN — LIDOCAINE HYDROCHLORIDE,EPINEPHRINE BITARTRATE 3 ML: 20; .005 INJECTION, SOLUTION EPIDURAL; INFILTRATION; INTRACAUDAL; PERINEURAL at 12:50

## 2025-05-10 RX ADMIN — EPHEDRINE SULFATE 10 MG: 50 INJECTION INTRAVENOUS at 13:07

## 2025-05-10 RX ADMIN — Medication 10 ML/HR: at 20:40

## 2025-05-10 ASSESSMENT — PAIN SCALES - GENERAL: PAINLEVEL_OUTOF10: 1

## 2025-05-10 ASSESSMENT — PAIN DESCRIPTION - DESCRIPTORS: DESCRIPTORS: ACHING

## 2025-05-10 ASSESSMENT — PAIN DESCRIPTION - LOCATION: LOCATION: BACK

## 2025-05-10 NOTE — ED TRIAGE NOTES
Pt arrives reporting contractions that began lasting night approximately 4-5 minutes apart.  Denies vaginal bleeding or leaking of fluid.

## 2025-05-10 NOTE — PROGRESS NOTES
Department of Obstetrics and Gynecology  Labor and Delivery   CNM Progress Note      SUBJECTIVE:  Pt reports feeling comfortable with epidural and resting    OBJECTIVE:      Vitals:    BP (!) 91/50   Pulse 86   Temp 97.9 °F (36.6 °C)   Resp 16   Ht 1.6 m (5' 3\")   LMP 07/26/2024   SpO2 94%   Some hypotension since epidural, pt and fetus are asymptomatic    Fetal heart rate:       Baseline Heart Rate:  130        Accelerations:  present       Long Term Variability:  moderate       Decelerations:  absent       Category 1  Contraction frequency: 5 minutes    Fetal Presentation:  Cephalic,     Fetal Position:  Cephalic    Membranes:  Intact    Cervix:           Dilation:  5 cm         Effacement:  75%         Station:  -2         Consistency:  soft         Position:  mid               DATA:  LAB REVIEW:    CBC:    Lab Results   Component Value Date/Time    WBC 10.4 05/10/2025 11:56 AM    RBC 4.47 05/10/2025 11:56 AM    HGB 13.3 05/10/2025 11:56 AM    HCT 38.7 05/10/2025 11:56 AM    MCV 86.6 05/10/2025 11:56 AM    RDW 13.6 05/10/2025 11:56 AM     05/10/2025 11:56 AM       ASSESSMENT & PLAN:    Normal progress,  reassuring fetal status, contractions are a bit spaced out since epidural  Discussed with pt and  progress and options.    Pt consents to beginning pitocin for augmentation.   Will reassess in 3-4 hours prn

## 2025-05-10 NOTE — ANESTHESIA PROCEDURE NOTES
Epidural Block    Patient location during procedure: OB  Start time: 5/10/2025 12:35 PM  End time: 5/10/2025 12:55 PM  Reason for block: labor epidural  Staffing  Performed: anesthesiologist   Anesthesiologist: Maria Elena Ornelas DO  Performed by: Maria Elena rOnelas DO  Authorized by: Maria Elena Ornelas DO    Epidural  Patient position: sitting  Prep: DuraPrep  Patient monitoring: cardiac monitor, continuous pulse ox and frequent blood pressure checks  Approach: midline  Location: L3-4  Injection technique: JEROD saline  Provider prep: mask and sterile gloves  Needle  Needle type: Tuohy   Needle gauge: 17 G  Needle length: 3.5 in  Needle insertion depth: 5 cm  Catheter type: end hole  Catheter size: 18 G  Catheter at skin depth: 10 cmCatheter Secured: tegaderm and tape  Assessment  Sensory level: T6  Events: None  Hemodynamics: stable  Attempts: 2  Outcomes: uncomplicated and patient tolerated procedure well  Preanesthetic Checklist  Completed: patient identified, IV checked, site marked, risks and benefits discussed, surgical/procedural consents, equipment checked, pre-op evaluation, timeout performed, anesthesia consent given, oxygen available, monitors applied/VS acknowledged and fire risk safety assessment completed and verbalized

## 2025-05-10 NOTE — PROGRESS NOTES
1145 pt moved up to L&D room 9 for active labor. Pt is ready for her epidural    1630 Emanuel called and requested for her to come evaluate pt. Her contractions are spaced apart at 5-7 minutes.    1645 decision made to start pitocin.

## 2025-05-10 NOTE — ED PROVIDER NOTES
Pt is a 34 year old  at 40 2/7 wks who presents with reports of increasingly painful contractions since last evening.  She states she passed mucus plug, but denies bleeding or leaking of fluids  She endorses normal fetal movements      The history is provided by the patient and medical records. No  was used.        Chief Complaint   Patient presents with    Contractions       Past Medical History:   Diagnosis Date    Gestational diabetes        History reviewed. No pertinent surgical history.      History reviewed. No pertinent family history.    Social History     Socioeconomic History    Marital status:      Spouse name: Not on file    Number of children: Not on file    Years of education: Not on file    Highest education level: Not on file   Occupational History    Not on file   Tobacco Use    Smoking status: Never     Passive exposure: Never    Smokeless tobacco: Never   Substance and Sexual Activity    Alcohol use: Never    Drug use: Never    Sexual activity: Not on file   Other Topics Concern    Not on file   Social History Narrative    Not on file     Social Drivers of Health     Financial Resource Strain: Not on file   Food Insecurity: Not on file   Transportation Needs: Not on file   Physical Activity: Not on file   Stress: Not on file   Social Connections: Not on file   Intimate Partner Violence: Not on file   Housing Stability: Not on file         ALLERGIES: Patient has no known allergies.    Review of Systems   Constitutional: Negative.    Respiratory: Negative.     Cardiovascular: Negative.    Gastrointestinal:  Positive for abdominal pain. Negative for constipation, diarrhea, nausea and vomiting.   Genitourinary:  Positive for pelvic pain and vaginal discharge. Negative for dysuria.   Musculoskeletal: Negative.    Neurological: Negative.    Psychiatric/Behavioral: Negative.         Vitals:    05/10/25 1116   BP: 119/70   Pulse: 90   Resp: 16   Temp: 97.9 °F (36.6 °C)

## 2025-05-10 NOTE — PROGRESS NOTES
: Bedside shift change report given to LOKESH Harmon RN (oncoming nurse) by CHANDAN Root RN (offgoing nurse). Report included the following information Nurse Handoff Report, Adult Overview, Intake/Output, MAR, and Recent Results.     : Start pushing    : LOKESH Haynes CNM at bedside for delivery    2100:  Baby girl    2330: TRANSFER - OUT REPORT:    Verbal report given to MERY Castillo on Janie Griffith  being transferred to MIU for routine progression of patient care       Report consisted of patient's Situation, Background, Assessment and   Recommendations(SBAR).     Information from the following report(s) Nurse Handoff Report, Adult Overview, Intake/Output, MAR, and Recent Results was reviewed with the receiving nurse.           Lines:   Peripheral IV 05/10/25 Right Forearm (Active)   Site Assessment Clean, dry & intact 05/10/25 2112   Line Status Infusing 05/10/25 2112   Phlebitis Assessment No symptoms 05/10/25 2112   Infiltration Assessment 0 05/10/25 2112   Dressing Status Clean, dry & intact 05/10/25 2112   Dressing Type Transparent 05/10/25 2112        Opportunity for questions and clarification was provided.      Patient transported with:  Registered Nurse

## 2025-05-10 NOTE — ANESTHESIA PRE PROCEDURE
Department of Anesthesiology  Preprocedure Note       Name:  Janie Griffith   Age:  34 y.o.  :  1990                                          MRN:  [id number]         Date:  5/10/2025      Surgeon: * Surgery not found *    Procedure:     Medications prior to admission:   Prior to Admission medications    Medication Sig Start Date End Date Taking? Authorizing Provider   magnesium 30 MG tablet Take 1 tablet by mouth 2 times daily   Yes Saritha Parson MD   Omega-3 Fatty Acids (OMEGA-3 PO)  20  Yes Saritha Parson MD   Prenatal Vit-Fe Fumarate-FA (PRENATAL PO) Take by mouth   Yes Saritha Parson MD   blood glucose test strips (ONETOUCH VERIO) strip USE TO CHECK BLOOD GLUCOSE 4 TIMES A DAY. PLEASE [employer] PREFERRED BRAND 25   Teagan Zhao APRN - CNP   Blood Glucose Monitoring Suppl (ONETOUCH VERIO REFLECT) w/Device KIT PLEASE USE TO CHECK BLOOD SUGAR FOUR TIMES A DAY. [employer] PREFERRED BRAND 24   Teagan Zhao APRN - CNP   Alcohol Swabs (ALCOHOL PREP) PADS Please use to four times a day to clean finger before checking blood sugar. Substitute for insurance preferred brand 24   Teagan Zhao APRN - CNP   Lancets MISC Use to check blood glucose 4 times a day. Please substitute for insurance preferred brand 24   Teagan Zhao APRN - CNP       Current medications:    Current Facility-Administered Medications   Medication Dose Route Frequency Provider Last Rate Last Admin   • terbutaline (BRETHINE) injection 0.25 mg  0.25 mg SubCUTAneous Once PRN Merlyn Haynes APRN - CNM       • sodium chloride flush 0.9 % injection 5-40 mL  5-40 mL IntraVENous 2 times per day Merlyn Haynes APRN - CNM       • sodium chloride flush 0.9 % injection 5-40 mL  5-40 mL IntraVENous PRN Merlyn Haynes APRN - CNM       • 0.9 % sodium chloride infusion  25 mL IntraVENous PRN Merlyn Haynes APRN - CNM       • acetaminophen (TYLENOL) tablet 650 mg

## 2025-05-10 NOTE — H&P
Subjective:       Janie Griffith is a 34 y.o.  female with EDC 2025 at 40 and 2/7 weeks gestation who is being admitted for labor management.  Her current obstetrical history is significant for gestational DM.  Patient reports contractions since yesterday, but have become more regular and painful this am.   Fetal Movement: normal.      Objective:       /70   Pulse 90   Temp 97.9 °F (36.6 °C)   Resp 16   LMP 2024     General:   alert, appears stated age, cooperative, and mild distress   Skin:   normal   HEENT:  Neck supple with midline trachea   Lungs:   clear to auscultation bilaterally   Heart:   regular rate and rhythm       Abdomen:   Gravid, soft, non tender, contractions palpate mod-strong.  EFW by [place] 8 -8 1/2 lbs   Pelvis:  Clinical staff offered to be present for exam: yes   Nornal, palpates adequate for vaginal delivery   FHT:  140 BPM. With mod variability   Uterine Size: S=D   Presentations: Cephalic   Cervix:     Dilation: 3cm    Effacement: 75%    Station:  -2    Consistency: Soft    Position: Middle     Lab Review   B, Rh+, Rubella-immune, Hepatitis B surface antigen non-reactive, GBS negative, HIV negative,    AFP:results not available   One hour GTT: she is diet controlled      Assessment:      40 and 2/7 weeks gestation.  Active phase labor. and requesting pain management  Obstetrical history significant for gestational DM diet controlled last pregnancy and with this one. , vacuum assisted first delivery       Plan:      Risks, benefits, alternatives and possible complications have been discussed in detail with the patient.  admission, and post admission procedures and expectations were discussed in detail.  All questions answered, all appropriate consents will be signed .   Expectant management., Anticipate vaginal delivery., and Analgesia: IM/IV narcotic and pt plans epidural

## 2025-05-11 LAB
ABO + RH BLD: NORMAL
BLOOD GROUP ANTIBODIES SERPL: NORMAL
SPECIMEN EXP DATE BLD: NORMAL

## 2025-05-11 PROCEDURE — 6370000000 HC RX 637 (ALT 250 FOR IP): Performed by: ADVANCED PRACTICE MIDWIFE

## 2025-05-11 PROCEDURE — 1120000000 HC RM PRIVATE OB

## 2025-05-11 RX ADMIN — IBUPROFEN 800 MG: 400 TABLET, FILM COATED ORAL at 16:17

## 2025-05-11 RX ADMIN — ACETAMINOPHEN 1000 MG: 500 TABLET ORAL at 05:22

## 2025-05-11 RX ADMIN — IBUPROFEN 800 MG: 400 TABLET, FILM COATED ORAL at 23:29

## 2025-05-11 RX ADMIN — DOCUSATE SODIUM 100 MG: 100 CAPSULE, LIQUID FILLED ORAL at 16:16

## 2025-05-11 RX ADMIN — IBUPROFEN 800 MG: 400 TABLET, FILM COATED ORAL at 05:22

## 2025-05-11 RX ADMIN — ACETAMINOPHEN 1000 MG: 500 TABLET ORAL at 16:17

## 2025-05-11 RX ADMIN — DOCUSATE SODIUM 100 MG: 100 CAPSULE, LIQUID FILLED ORAL at 05:22

## 2025-05-11 ASSESSMENT — PAIN SCALES - GENERAL
PAINLEVEL_OUTOF10: 4
PAINLEVEL_OUTOF10: 5

## 2025-05-11 ASSESSMENT — PAIN DESCRIPTION - DESCRIPTORS
DESCRIPTORS: DISCOMFORT
DESCRIPTORS: CRAMPING

## 2025-05-11 ASSESSMENT — PAIN - FUNCTIONAL ASSESSMENT
PAIN_FUNCTIONAL_ASSESSMENT: ACTIVITIES ARE NOT PREVENTED
PAIN_FUNCTIONAL_ASSESSMENT: ACTIVITIES ARE NOT PREVENTED

## 2025-05-11 ASSESSMENT — PAIN DESCRIPTION - ORIENTATION
ORIENTATION: LOWER
ORIENTATION: MID;LOWER

## 2025-05-11 ASSESSMENT — PAIN DESCRIPTION - LOCATION
LOCATION: ABDOMEN
LOCATION: BACK;PERINEUM
LOCATION: BACK;PERINEUM

## 2025-05-11 NOTE — LACTATION NOTE
This note was copied from a baby's chart.  Infant born vaginally last evening to a  mom at 40 2/7 weeks gestation. Mom combo fed her first child and states her milk supply was low. Educated mom on the risks of early supplementation to milk production. Suggest she only feed at breast for the first couple of weeks, unless medically indicated otherwise. Assisted mom with positioning infant at the breast in the football position; deep latch noted with rhythmic sucking and occasional swallows noted.    Feeding Plan:  Mother will keep baby skin to skin as often as possible, feed on demand, 8-12x/day , respond to feeding cues, obtain latch, listen for audible swallowing, be aware of signs of oxytocin release/ cramping,thirst,sleepiness while breastfeeding, offer both breasts,and will not limit feedings.  Mother agrees to utilize breast massage while nursing to facilitate lactogenesis.

## 2025-05-11 NOTE — PROGRESS NOTES
Post-Partum Day Number 1 Progress Note    Patient doing well post-partum without significant complaints.  Voiding without difficulty, normal lochia. Tolerating regular diet without N/V. Ambulating well. Breastfeeding without difficulty. Reports some perineal and back pain, mild--just received Motrin.     Vitals:  Patient Vitals for the past 24 hrs:   BP Temp Temp src Pulse Resp SpO2 Height   25 0419 109/68 97.7 °F (36.5 °C) Oral 90 16 90 % --   05/10/25 2346 114/63 97.9 °F (36.6 °C) Oral 81 16 94 % --   05/10/25 2306 (!) 97/54 -- -- 96 -- -- --   05/10/25 2251 127/67 -- -- 94 -- -- --   05/10/25 2236 (!) 114/59 -- -- 83 -- -- --   05/10/25 2221 (!) 108/59 -- -- 75 -- -- --   05/10/25 2206 (!) 102/55 -- -- 87 -- -- --   05/10/25 2151 (!) 109/54 -- -- 81 -- -- --   05/10/25 2136 (!) 117/56 -- -- 81 -- -- --   05/10/25 2120 (!) 107/54 98.4 °F (36.9 °C) Oral 81 16 -- --   05/10/25 2112 128/62 -- -- 95 -- -- --   05/10/25 2013 137/65 98.7 °F (37.1 °C) Oral 94 16 97 % --   05/10/25 1914 (!) 100/59 -- -- 82 16 -- --   05/10/25 1635 (!) 97/50 98.1 °F (36.7 °C) -- 66 16 -- --   05/10/25 1512 (!) 84/52 -- -- 85 -- (!) 82 % --   05/10/25 1311 (!) 91/50 -- -- 86 -- -- --   05/10/25 1308 (!) 83/45 -- -- 85 -- 94 % --   05/10/25 1305 (!) 83/50 -- -- 78 -- -- --   05/10/25 1303 -- -- -- 83 -- 95 % --   05/10/25 1302 (!) 87/49 -- -- 82 -- -- --   05/10/25 1259 (!) 89/48 -- -- 85 -- -- --   05/10/25 1256 (!) 102/48 -- -- 88 -- -- --   05/10/25 1254 (!) 95/51 98.1 °F (36.7 °C) Oral 78 16 -- --   05/10/25 1157 -- -- -- -- -- -- 1.6 m (5' 3\")   05/10/25 1116 119/70 97.9 °F (36.6 °C) -- 90 16 -- --     Temp (24hrs), Av.1 °F (36.7 °C), Min:97.7 °F (36.5 °C), Max:98.7 °F (37.1 °C)      Vital signs stable, afebrile.    Exam:      Const: NAD, alert, awake  Resp:                Abdomen soft, fundus firm, nontender               Lower extremities- nontender without edema; no cords    Labs:   Recent Results (from the past 24 hours)

## 2025-05-11 NOTE — DISCHARGE INSTRUCTIONS
Postpartum Support Groups   We know that all of us are dealing with a tremendous amount of uncertainty, confusion and disruption to our daily lives, which may result in increased anxiety, depression and fear. If you are feeling unsettled or worse, please know that we are here to help. During this time of increased caution and care for one another, [facility] [place] ([facility]) is offering virtual and in person support groups to ALL MOTHERS in [place] regardless of the age of your child/children as a way to help weather this emotional storm together. Social support is an important part of self-care during this time of physical distancing.  Virtual postpartum support group meetings available at www.postpartumva.org  Warm Line: 137.645.8718    Breastfeeding Support Groups   1st and 3rd Wednesday of each month at [facility]  2nd and 4th Tuesday of each month at [facility] (in education center behind cafeteria)    [email or web address]

## 2025-05-11 NOTE — PROGRESS NOTES
Bedside and Verbal transfer report given to David RN (oncoming nurse) by Flaca ORDONEZ (offgoing nurse). Report included the following information Nurse Handoff Report, Adult Overview, Intake/Output, and MAR.

## 2025-05-11 NOTE — L&D DELIVERY NOTE
ADRIEN Griffith [[id number]]      Labor Events     Labor: No   Steroids: None  Cervical Ripening Date/Time:      Antibiotics Received during Labor: No  Rupture Identifier: Sac 1  Rupture Date/Time:  5/10/25 19:58:00   Rupture Type: SROM  Fluid Color: Meconium  Meconium Consistency: Thin  Fluid Odor: None  Fluid Volume: Moderate  Induction: None  Augmentation: Oxytocin  Labor Complications: None   OB: DELIVERY - COMPLICATIONS    Gestational diabetes          Anesthesia    Method: Epidural       Labor Event Times      Labor onset date/time:        Dilation complete date/time:  5/10/25 19:58:00 EDT     Start pushing date/time:  5/10/2025 20:54:00   Decision date/time (emergent ):            Delivery Details      Delivery Date: 5/10/25 Delivery Time: 21:00:00   Delivery Type: Vaginal, Spontaneous              [place] Presentation    Presentation: Vertex       Shoulder Dystocia    Shoulder Dystocia Present?: No       Assisted Delivery Details    Forceps Attempted?: No  Vacuum Extractor Attempted?: No                           Cord    Vessels: 3 Vessels  Complications: None  Delayed Cord Clamping?: Yes  Cord Clamped Date/Time: 5/10/2025 21:05:00  Cord Blood Disposition: Discard  Gases Sent?: No              Placenta    Date/Time: 5/10/2025 21:06:40  Removal: Spontaneous  Appearance: Intact  Disposition: Discarded       Lacerations    Episiotomy: None  Perineal Lacerations: 2nd  Number of Repair Packets: 1       Vaginal Counts    Initial Count Personnel: MERY SABILLON  Initial Count Verified By: MERY DELCID  Intial Sponge Count: Correct Intial Needles Count: Correct Intial Instruments Count: Correct          Blood Loss  Mother: Janie Griffith #[id number]     Start of Mother's Information      Delivery Blood Loss   Intrapartum & Postpartum: 05/10/25 0900 - 05/10/25 2144    Delivery Admission: 05/10/25 0900 - 05/10/25 2144         Intrapartum & Postpartum Delivery Admission    Quantitative Blood

## 2025-05-12 VITALS
HEART RATE: 70 BPM | RESPIRATION RATE: 16 BRPM | SYSTOLIC BLOOD PRESSURE: 104 MMHG | TEMPERATURE: 97.9 F | DIASTOLIC BLOOD PRESSURE: 76 MMHG | HEIGHT: 63 IN | OXYGEN SATURATION: 95 %

## 2025-05-12 LAB — RPR SER QL: NONREACTIVE

## 2025-05-12 PROCEDURE — 6370000000 HC RX 637 (ALT 250 FOR IP): Performed by: ADVANCED PRACTICE MIDWIFE

## 2025-05-12 RX ORDER — IBUPROFEN 800 MG/1
800 TABLET, FILM COATED ORAL EVERY 8 HOURS SCHEDULED
Qty: 40 TABLET | Refills: 1 | Status: SHIPPED | OUTPATIENT
Start: 2025-05-12

## 2025-05-12 RX ADMIN — IBUPROFEN 800 MG: 400 TABLET, FILM COATED ORAL at 07:24

## 2025-05-12 RX ADMIN — ACETAMINOPHEN 1000 MG: 500 TABLET ORAL at 07:23

## 2025-05-12 RX ADMIN — DOCUSATE SODIUM 100 MG: 100 CAPSULE, LIQUID FILLED ORAL at 07:24

## 2025-05-12 ASSESSMENT — PAIN DESCRIPTION - ORIENTATION
ORIENTATION: LOWER
ORIENTATION: LOWER

## 2025-05-12 ASSESSMENT — PAIN DESCRIPTION - LOCATION
LOCATION: ABDOMEN;BACK;PERINEUM
LOCATION: BACK;PERINEUM;ABDOMEN

## 2025-05-12 ASSESSMENT — PAIN SCALES - GENERAL: PAINLEVEL_OUTOF10: 5

## 2025-05-12 ASSESSMENT — PAIN DESCRIPTION - DESCRIPTORS
DESCRIPTORS: CRAMPING;DISCOMFORT;SORE
DESCRIPTORS: SORE;DISCOMFORT;CRAMPING

## 2025-05-12 NOTE — DISCHARGE SUMMARY
Obstetrical Discharge Summary     Name: Janie Griffith MRN: [id number]  SSN: [id number]    YOB: 1990  Age: 34 y.o.  Sex: female      Admit Date: 5/10/2025    Discharge Date: 2025     Admitting Physician: CHARLIE Maddox CNM     Attending Physician:  Emmy Gonzalez DO     Admission Diagnoses: Normal labor [O80, Z37.9]    Discharge Diagnoses:   Information for the patient's :  ADRIEN Griffith [[id number]]   @[id number]@     Additional Diagnoses:  No components found for: \"OBEXTABORH\", \"OBEXTABSCRN\", \"OBEXTRUBELLA\", \"OBEXTGRBS\"    Hospital Course: Admitted in spontaneous labor, uncomplicated delivery. Uncomplicated postpartum course, discharged home PPD2.    Patient Instructions:   Current Discharge Medication List        START taking these medications    Details   ibuprofen (ADVIL;MOTRIN) 800 MG tablet Take 1 tablet by mouth every 8 hours  Qty: 40 tablet, Refills: 1           CONTINUE these medications which have NOT CHANGED    Details   Prenatal Vit-Fe Fumarate-FA (PRENATAL PO) Take by mouth      Blood Glucose Monitoring Suppl (ONETOUCH VERIO REFLECT) w/Device KIT PLEASE USE TO CHECK BLOOD SUGAR FOUR TIMES A DAY. [employer] PREFERRED BRAND  Qty: 1 kit, Refills: 0      Alcohol Swabs (ALCOHOL PREP) PADS Please use to four times a day to clean finger before checking blood sugar. Substitute for insurance preferred brand  Qty: 200 each, Refills: 3      Lancets MISC Use to check blood glucose 4 times a day. Please substitute for insurance preferred brand  Qty: 200 each, Refills: 3           STOP taking these medications       blood glucose test strips (ONETOUCH VERIO) strip Comments:   Reason for Stopping:         magnesium 30 MG tablet Comments:   Reason for Stopping:         Omega-3 Fatty Acids (OMEGA-3 PO) Comments:   Reason for Stopping:               Disposition at Discharge: Home or self care    Condition at Discharge: Stable    Reference my discharge

## 2025-05-12 NOTE — PROGRESS NOTES
Bedside shift change report given to EVELYN Goodwin (oncoming nurse) by KIARA Byrne (offgoing nurse). Report included the following information Nurse Handoff Report.

## 2025-05-12 NOTE — PROGRESS NOTES
Post-Partum Day Number 2 Progress Note    Janie Griffith         Information for the patient's :  ADRIEN Griffith [[id number]]   Vaginal, Spontaneous Patient doing well without significant complaint.  Voiding without difficulty, normal lochia. Ready for discharge home.    Vitals:  /72   Pulse 71   Temp 97.7 °F (36.5 °C) (Oral)   Resp 16   Ht 1.6 m (5' 3\")   LMP 2024   SpO2 98%   Breastfeeding Unknown   Temp (24hrs), Av.9 °F (36.6 °C), Min:97.7 °F (36.5 °C), Max:98.1 °F (36.7 °C)      Exam:        Patient without distress.                Fundus firm, nontender                Perineum with normal lochia noted per nursing assessment                Lower extremities are negative for pathological edema    Labs:     Lab Results   Component Value Date/Time    WBC 10.4 05/10/2025 11:56 AM    HGB 13.3 05/10/2025 11:56 AM    HCT 38.7 05/10/2025 11:56 AM     05/10/2025 11:56 AM       No results found for this or any previous visit (from the past 24 hours).    Assessment: Doing well, post partum day 2 s/p TSVD    Plan:   - Discharge home today  - XX, Rh+, RI  - GDMA1- 2h GTT postpartum  - Follow up in office in office at [facility] in 2 weeks  - Pain medication prescription(s) sent.  - Questions answered.     Dispo: Discharge home today    Yulia Narayan MD